# Patient Record
Sex: FEMALE
[De-identification: names, ages, dates, MRNs, and addresses within clinical notes are randomized per-mention and may not be internally consistent; named-entity substitution may affect disease eponyms.]

---

## 2024-08-16 ENCOUNTER — APPOINTMENT (OUTPATIENT)
Dept: MATERNAL FETAL MEDICINE | Facility: CLINIC | Age: 39
End: 2024-08-16
Payer: COMMERCIAL

## 2024-08-16 PROBLEM — Z00.00 ENCOUNTER FOR PREVENTIVE HEALTH EXAMINATION: Status: ACTIVE | Noted: 2024-08-16

## 2024-08-16 PROCEDURE — 99205 OFFICE O/P NEW HI 60 MIN: CPT | Mod: 95

## 2024-08-16 PROCEDURE — G2211 COMPLEX E/M VISIT ADD ON: CPT | Mod: NC

## 2024-08-16 NOTE — DISCUSSION/SUMMARY
[FreeTextEntry1] :  Recurrent pregnancy loss (RPL): RPL is classically defined as the occurrence of three or more consecutive losses of clinically recognized pregnancies prior to the 20th week of gestation. Clinical pregnancies are confirmed by ultrasound or histopathologic evaluation; chemical pregnancies are not included. There is a general consensus that healthy women should not undergo extensive evaluation after a single first trimester or early second trimester spontaneous miscarriage (up to 20 weeks), given these are relatively common, sporadic events. In prospective studies, the overall risk of miscarriage in the next pregnancy remains about 15% after one miscarriage, but rises to 17 to 31% after two consecutive miscarriages and to 25 to 46% after three or more miscarriages. Based on these and similar data, most experts initiate evaluation and treatment of RPL after two consecutive miscarriages.  Most women with RPL have a good prognosis for eventually having a successful pregnancy, even when a definitive diagnosis is not made and no treatment initiated (reported rates of subsequent successful pregnancy outcome have ranged from 47 to 77%).  Although RPL is an important problem in women's health, there are many unsolved questions regarding etiology, evaluation, and management. Unfortunately, the cause of RPL can be determined in only 50% of patients. General etiological categories of RPL include anatomic, immunological, genetic, endocrine, infectious, thrombophilic, and environmental factors.   Recommendations: 1. Schedule viability US 2. Plan for testing for antiphospholipid syndrome, parental karyotypes to rule out a balanced translocation, thyroid function testing, hemoglobin A1C, and evaluation of uterus/uterine cavity outside of pregnancy. We also reviewed lifestyle modifications to improve fertility, and optimize pregnancy outcomes in the future.

## 2024-08-16 NOTE — HISTORY OF PRESENT ILLNESS
[Home] : at home, [unfilled] , at the time of the visit. [Medical Office: ([facility])___] : at the medical office located in  [Verbal consent obtained from patient] : the patient, [unfilled] [FreeTextEntry1] :  Ms. Hinson is a 39 yo  at approximately 8 wga presents for [facility] consultation.   Her self reported ethnicity is white. Her male partner is from a similar ethnic background and states that they have completed genetic screening.  Her OB history is as follows: G1: , 7 wga, SAB, spontaneous G2: , SAB, spontaneous G3: , 10wga, SAB, IVF, reports normal SIS G4: current, spontaneous  She has had lithotripsy for kidney stones. Her reports no additional medical or surgical history. There is no family history of birth defects, intellectual disability, genetic conditions, or stillbirths.

## 2024-08-23 ENCOUNTER — ASOB RESULT (OUTPATIENT)
Age: 39
End: 2024-08-23

## 2024-08-23 ENCOUNTER — LABORATORY RESULT (OUTPATIENT)
Age: 39
End: 2024-08-23

## 2024-08-23 ENCOUNTER — APPOINTMENT (OUTPATIENT)
Dept: ANTEPARTUM | Facility: CLINIC | Age: 39
End: 2024-08-23
Payer: COMMERCIAL

## 2024-08-23 DIAGNOSIS — N96 RECURRENT PREGNANCY LOSS: ICD-10-CM

## 2024-08-23 PROCEDURE — 76801 OB US < 14 WKS SINGLE FETUS: CPT

## 2024-08-23 PROCEDURE — 76817 TRANSVAGINAL US OBSTETRIC: CPT

## 2024-08-27 LAB
ESTIMATED AVERAGE GLUCOSE: 103 MG/DL
HBA1C MFR BLD HPLC: 5.2 %
TSH SERPL-ACNC: 1.69 UIU/ML

## 2024-09-11 ENCOUNTER — ASOB RESULT (OUTPATIENT)
Age: 39
End: 2024-09-11

## 2024-09-11 ENCOUNTER — APPOINTMENT (OUTPATIENT)
Dept: ANTEPARTUM | Facility: CLINIC | Age: 39
End: 2024-09-11
Payer: COMMERCIAL

## 2024-09-11 PROCEDURE — 76813 OB US NUCHAL MEAS 1 GEST: CPT

## 2024-09-11 PROCEDURE — 93976 VASCULAR STUDY: CPT

## 2024-09-11 PROCEDURE — 76817 TRANSVAGINAL US OBSTETRIC: CPT

## 2024-10-09 ENCOUNTER — ASOB RESULT (OUTPATIENT)
Age: 39
End: 2024-10-09

## 2024-10-09 ENCOUNTER — APPOINTMENT (OUTPATIENT)
Dept: ANTEPARTUM | Facility: CLINIC | Age: 39
End: 2024-10-09
Payer: COMMERCIAL

## 2024-10-09 PROCEDURE — 76817 TRANSVAGINAL US OBSTETRIC: CPT

## 2024-10-09 PROCEDURE — 76805 OB US >/= 14 WKS SNGL FETUS: CPT

## 2024-10-11 ENCOUNTER — TRANSCRIPTION ENCOUNTER (OUTPATIENT)
Age: 39
End: 2024-10-11

## 2024-11-13 ENCOUNTER — APPOINTMENT (OUTPATIENT)
Dept: ANTEPARTUM | Facility: CLINIC | Age: 39
End: 2024-11-13

## 2024-11-13 ENCOUNTER — ASOB RESULT (OUTPATIENT)
Age: 39
End: 2024-11-13

## 2024-11-13 PROCEDURE — 76816 OB US FOLLOW-UP PER FETUS: CPT

## 2024-11-13 PROCEDURE — 76817 TRANSVAGINAL US OBSTETRIC: CPT

## 2024-11-27 ENCOUNTER — APPOINTMENT (OUTPATIENT)
Dept: ANTEPARTUM | Facility: CLINIC | Age: 39
End: 2024-11-27
Payer: COMMERCIAL

## 2024-11-27 ENCOUNTER — ASOB RESULT (OUTPATIENT)
Age: 39
End: 2024-11-27

## 2024-11-27 PROCEDURE — 76816 OB US FOLLOW-UP PER FETUS: CPT

## 2024-12-13 ENCOUNTER — APPOINTMENT (OUTPATIENT)
Dept: PEDIATRIC CARDIOLOGY | Facility: CLINIC | Age: 39
End: 2024-12-13

## 2024-12-13 PROCEDURE — 76825 ECHO EXAM OF FETAL HEART: CPT

## 2024-12-13 PROCEDURE — 76827 ECHO EXAM OF FETAL HEART: CPT

## 2024-12-13 PROCEDURE — 93325 DOPPLER ECHO COLOR FLOW MAPG: CPT | Mod: 59

## 2024-12-13 PROCEDURE — 76820 UMBILICAL ARTERY ECHO: CPT

## 2024-12-13 PROCEDURE — 76821 MIDDLE CEREBRAL ARTERY ECHO: CPT

## 2024-12-13 PROCEDURE — 99202 OFFICE O/P NEW SF 15 MIN: CPT | Mod: 25
